# Patient Record
Sex: MALE | Race: WHITE | NOT HISPANIC OR LATINO | URBAN - METROPOLITAN AREA
[De-identification: names, ages, dates, MRNs, and addresses within clinical notes are randomized per-mention and may not be internally consistent; named-entity substitution may affect disease eponyms.]

---

## 2017-05-25 ENCOUNTER — OUTPATIENT (OUTPATIENT)
Dept: OUTPATIENT SERVICES | Facility: HOSPITAL | Age: 47
LOS: 1 days | End: 2017-05-25
Payer: COMMERCIAL

## 2017-05-25 VITALS
HEART RATE: 73 BPM | RESPIRATION RATE: 15 BRPM | SYSTOLIC BLOOD PRESSURE: 140 MMHG | OXYGEN SATURATION: 97 % | DIASTOLIC BLOOD PRESSURE: 77 MMHG

## 2017-05-25 VITALS
TEMPERATURE: 99 F | SYSTOLIC BLOOD PRESSURE: 139 MMHG | OXYGEN SATURATION: 98 % | HEART RATE: 82 BPM | RESPIRATION RATE: 15 BRPM | WEIGHT: 231.04 LBS | DIASTOLIC BLOOD PRESSURE: 93 MMHG | HEIGHT: 77 IN

## 2017-05-25 DIAGNOSIS — S82.899A OTHER FRACTURE OF UNSPECIFIED LOWER LEG, INITIAL ENCOUNTER FOR CLOSED FRACTURE: Chronic | ICD-10-CM

## 2017-05-25 DIAGNOSIS — Z98.890 OTHER SPECIFIED POSTPROCEDURAL STATES: Chronic | ICD-10-CM

## 2017-05-25 DIAGNOSIS — S82.91XA UNSPECIFIED FRACTURE OF RIGHT LOWER LEG, INITIAL ENCOUNTER FOR CLOSED FRACTURE: Chronic | ICD-10-CM

## 2017-05-25 DIAGNOSIS — H40.1192 PRIMARY OPEN-ANGLE GLAUCOMA, UNSPECIFIED EYE, MODERATE STAGE: ICD-10-CM

## 2017-05-25 PROCEDURE — 66180 AQUEOUS SHUNT EYE W/GRAFT: CPT | Mod: LT

## 2017-05-25 PROCEDURE — C1783: CPT

## 2017-05-25 PROCEDURE — C1762: CPT

## 2017-05-25 NOTE — ASU DISCHARGE PLAN (ADULT/PEDIATRIC). - PT EDUC
Eye shield with instructions , sunglasses and eye kit given to patient./other (specify) Implant card (specify)/other (specify)/[place] valve card, Eye shield with instructions , sunglasses and eye kit given to patient.

## 2017-05-25 NOTE — ASU DISCHARGE PLAN (ADULT/PEDIATRIC). - NOTIFY
Persistent Nausea and Vomiting/Bleeding that does not stop/Swelling that continues/Fever greater than 101/Pain not relieved by Medications

## 2017-05-25 NOTE — ASU DISCHARGE PLAN (ADULT/PEDIATRIC). - SPECIAL INSTRUCTIONS
Your eye patch will remain on overnight. Your doctor will remove it at your appointment tomorrow and instruct you on what drops to take at that time. Continue drops as usual in the other eye. Bring the eye kit and all of your other eye medications  to the office for each visit. You may experience some itching or scratchiness following surgery. This is normal and is usually relieved with Tylenol which can be taken 650mg by mouth every 4-6 hours for pain. Avoid Aspirin or Motrin. If you experience persistent decrease in vision, pain, excessive bleeding , temperature above 101, persistent nausea or vomiting contact your surgeon at [phone].

## 2017-05-25 NOTE — ASU PATIENT PROFILE, ADULT - PSH
Ankle fracture  sx right repair  H/O bilateral inguinal hernia repair    Leg fracture, right  Sx repair with dimitrios and pins